# Patient Record
Sex: MALE | URBAN - METROPOLITAN AREA
[De-identification: names, ages, dates, MRNs, and addresses within clinical notes are randomized per-mention and may not be internally consistent; named-entity substitution may affect disease eponyms.]

---

## 2019-10-16 ENCOUNTER — TELEPHONE (OUTPATIENT)
Dept: GERIATRICS | Age: 84
End: 2019-10-16

## 2019-10-16 NOTE — TELEPHONE ENCOUNTER
- Pt's daughter, Gildardo Arreguin called ([phone])  - Pt is being d/c'd today  - Family would like to discuss meds with you before d/c

## 2019-10-17 NOTE — TELEPHONE ENCOUNTER
Thank you, I met with them at the bedside and we reviewed medication list in detail  I have asked them to schedule with me in office in 2-3 weeks to follow-up medication changes and comprehensive geriatric assessment  Thank you